# Patient Record
Sex: FEMALE | Race: WHITE | Employment: UNEMPLOYED | URBAN - NONMETROPOLITAN AREA
[De-identification: names, ages, dates, MRNs, and addresses within clinical notes are randomized per-mention and may not be internally consistent; named-entity substitution may affect disease eponyms.]

---

## 2017-03-02 ENCOUNTER — TELEPHONE (OUTPATIENT)
Dept: PULMONOLOGY | Age: 52
End: 2017-03-02

## 2017-05-05 DIAGNOSIS — J44.9 COPD, SEVERE (HCC): ICD-10-CM

## 2017-05-05 RX ORDER — ALBUTEROL SULFATE 2.5 MG/3ML
SOLUTION RESPIRATORY (INHALATION)
Qty: 120 VIAL | Refills: 7 | Status: SHIPPED | OUTPATIENT
Start: 2017-05-05 | End: 2018-11-08 | Stop reason: SDUPTHER

## 2017-05-08 ENCOUNTER — OFFICE VISIT (OUTPATIENT)
Dept: PULMONOLOGY | Age: 52
End: 2017-05-08

## 2017-05-08 VITALS
HEIGHT: 62 IN | DIASTOLIC BLOOD PRESSURE: 58 MMHG | BODY MASS INDEX: 34.96 KG/M2 | OXYGEN SATURATION: 93 % | SYSTOLIC BLOOD PRESSURE: 102 MMHG | TEMPERATURE: 97.6 F | WEIGHT: 190 LBS | HEART RATE: 100 BPM

## 2017-05-08 DIAGNOSIS — J44.9 COPD, SEVERE (HCC): Primary | ICD-10-CM

## 2017-05-08 PROCEDURE — G8428 CUR MEDS NOT DOCUMENT: HCPCS | Performed by: INTERNAL MEDICINE

## 2017-05-08 PROCEDURE — 99214 OFFICE O/P EST MOD 30 MIN: CPT | Performed by: INTERNAL MEDICINE

## 2017-05-08 PROCEDURE — 4004F PT TOBACCO SCREEN RCVD TLK: CPT | Performed by: INTERNAL MEDICINE

## 2017-05-08 PROCEDURE — G8417 CALC BMI ABV UP PARAM F/U: HCPCS | Performed by: INTERNAL MEDICINE

## 2017-05-08 PROCEDURE — 3014F SCREEN MAMMO DOC REV: CPT | Performed by: INTERNAL MEDICINE

## 2017-05-08 PROCEDURE — 3023F SPIROM DOC REV: CPT | Performed by: INTERNAL MEDICINE

## 2017-05-08 PROCEDURE — G8926 SPIRO NO PERF OR DOC: HCPCS | Performed by: INTERNAL MEDICINE

## 2017-05-08 RX ORDER — POTASSIUM CHLORIDE 1.5 G/1.77G
20 POWDER, FOR SOLUTION ORAL 2 TIMES DAILY
COMMUNITY
End: 2017-11-09

## 2017-05-08 RX ORDER — FLUTICASONE FUROATE AND VILANTEROL 200; 25 UG/1; UG/1
POWDER RESPIRATORY (INHALATION)
COMMUNITY
End: 2019-06-13 | Stop reason: ALTCHOICE

## 2017-05-08 RX ORDER — CLONAZEPAM 0.5 MG/1
0.5 TABLET ORAL 2 TIMES DAILY PRN
COMMUNITY

## 2017-05-08 RX ORDER — MESALAMINE 1.2 G/1
1200 TABLET, DELAYED RELEASE ORAL PRN
COMMUNITY
End: 2018-02-06

## 2017-05-08 RX ORDER — GABAPENTIN 100 MG/1
300 CAPSULE ORAL 3 TIMES DAILY
COMMUNITY
End: 2018-06-29

## 2017-06-28 ENCOUNTER — TELEPHONE (OUTPATIENT)
Dept: PULMONOLOGY | Age: 52
End: 2017-06-28

## 2017-08-08 DIAGNOSIS — J44.9 STAGE 3 SEVERE COPD BY GOLD CLASSIFICATION (HCC): ICD-10-CM

## 2017-08-22 ENCOUNTER — TELEPHONE (OUTPATIENT)
Dept: PULMONOLOGY | Age: 52
End: 2017-08-22

## 2017-08-22 DIAGNOSIS — J44.9 STAGE 4 VERY SEVERE COPD BY GOLD CLASSIFICATION (HCC): Primary | ICD-10-CM

## 2017-09-06 ENCOUNTER — TELEPHONE (OUTPATIENT)
Dept: PULMONOLOGY | Age: 52
End: 2017-09-06

## 2017-11-09 ENCOUNTER — OFFICE VISIT (OUTPATIENT)
Dept: PULMONOLOGY | Age: 52
End: 2017-11-09
Payer: MEDICARE

## 2017-11-09 VITALS
DIASTOLIC BLOOD PRESSURE: 62 MMHG | TEMPERATURE: 97.9 F | BODY MASS INDEX: 38.83 KG/M2 | HEART RATE: 119 BPM | SYSTOLIC BLOOD PRESSURE: 110 MMHG | WEIGHT: 211 LBS | OXYGEN SATURATION: 96 % | HEIGHT: 62 IN

## 2017-11-09 DIAGNOSIS — J45.50 SEVERE PERSISTENT ASTHMA WITHOUT COMPLICATION: ICD-10-CM

## 2017-11-09 DIAGNOSIS — J44.9 STAGE 4 VERY SEVERE COPD BY GOLD CLASSIFICATION (HCC): Primary | ICD-10-CM

## 2017-11-09 DIAGNOSIS — Z01.818 PRE-OP EVALUATION: ICD-10-CM

## 2017-11-09 PROCEDURE — 3017F COLORECTAL CA SCREEN DOC REV: CPT | Performed by: INTERNAL MEDICINE

## 2017-11-09 PROCEDURE — 3023F SPIROM DOC REV: CPT | Performed by: INTERNAL MEDICINE

## 2017-11-09 PROCEDURE — 99215 OFFICE O/P EST HI 40 MIN: CPT | Performed by: INTERNAL MEDICINE

## 2017-11-09 PROCEDURE — 1036F TOBACCO NON-USER: CPT | Performed by: INTERNAL MEDICINE

## 2017-11-09 PROCEDURE — G8427 DOCREV CUR MEDS BY ELIG CLIN: HCPCS | Performed by: INTERNAL MEDICINE

## 2017-11-09 PROCEDURE — G8484 FLU IMMUNIZE NO ADMIN: HCPCS | Performed by: INTERNAL MEDICINE

## 2017-11-09 PROCEDURE — G8926 SPIRO NO PERF OR DOC: HCPCS | Performed by: INTERNAL MEDICINE

## 2017-11-09 PROCEDURE — G8417 CALC BMI ABV UP PARAM F/U: HCPCS | Performed by: INTERNAL MEDICINE

## 2017-11-09 RX ORDER — RANOLAZINE 500 MG/1
500 TABLET, EXTENDED RELEASE ORAL 2 TIMES DAILY
COMMUNITY
End: 2019-02-20

## 2017-11-09 RX ORDER — EPINEPHRINE 0.3 MG/.3ML
0.3 INJECTION SUBCUTANEOUS ONCE
Qty: 1 EACH | Refills: 0 | Status: SHIPPED | OUTPATIENT
Start: 2017-11-09 | End: 2018-02-06

## 2017-11-09 RX ORDER — AZELASTINE HYDROCHLORIDE, FLUTICASONE PROPIONATE 137; 50 UG/1; UG/1
SPRAY, METERED NASAL
COMMUNITY
End: 2018-02-06

## 2017-11-09 NOTE — PATIENT INSTRUCTIONS
Health Maintenance Due   Topic Date Due    HIV screen  11/11/1981    DTaP/Tdap/Td vaccine (1 - Tdap) 11/11/1985    Cervical cancer screen  11/11/1987    Lipid screen  11/11/2006    Diabetes screen  11/11/2006    Breast cancer screen  11/11/2016    Colon cancer screen colonoscopy  11/11/2016    Flu vaccine (1) 09/01/2017     Recommendations/Plan:  - Will resubmit application for Xolair 532IT dose every 2 weeks. ( Max 150mg /injection site) with financial assistance for co- pay. She agreed to go for Xolair therapy. She verbalizes understanding.  -She refused to go for home BiPAP/Triology eval for her severe COPD. She told me that she is severely claustrophobic and she can not keep anything on her face.  -Continue Daliresp 500mcg 1 tab po daily.   -Continue Breo Ellipta 200/25 mcg/blister DPI 1puff daily in am.  -Continue Spiriva Respimat 2 INH once daily in am.  -Continue Albuterol HFA 2 puffs Q6h and Albuterol nebs 2.5mg via nebs Q6h prn.  -She instructed to use Albuterol HFA  inhaler 2 puffs Q6h prn or Albuterol 2.5mg nebs Q6h prn (the nebulizer) at one time as rescue medication not both at the same time. She verbalizes understanding.   - Patient educated to quit smoking as soon as possible. Patient verbalizes understanding of adverse consequences of tobacco smoking (4minutes). -Patient advised to continue prescribed inhalers and keep good compliance. Patient verbalizes understanding.   - Patient educated to update his pneumococcal vaccine with family physician and take influenza vaccine in coming season with out fail. Patient verbalizes understanding.   - Patient educated about my impression and plan. Patient verbalizes understanding.  - She was advised to use 4LPM of home O2 with exercise, 2LPM with sleep and at rest.  - Schedule patient for follow up with my clinic in 4 months.  Patient advised to make early appointment if needed.   -Patient can go for planned surgery from pulmonary point of view with the following risk I.e Colonoscopy by MD Hadley  -Patient is at high risk for carl and post operative pulmonary complications, if general anesthesia is used as anesthesia of choice for planned procedure. The risk also includes prolonged mechanical ventilation with requirement of tracheostomy for weaning from vent.  -Patient needs vigorous bronchodilator therapy with albuterol 2.5mg via nebulizer Q6h and Q2h prn before, during and after procedure.  Patient need to continue rest of Her inhalers as prescribed.  -Patient about increased risk and she is aware of the risk

## 2017-11-09 NOTE — PROGRESS NOTES
[facility]                                                 Pulmonary medicine clinic follow up note/Pre op for colonoscopy by MD Hadley     Lung Nodule Screening     [] Qualifies    [x] Does not qualify   [] Declined    [] Completed    Patient: Carol Glaser  : 1965      Carol Glaser is a 49 y. o.oldfemale came for follow up regarding her verey severe COPD and bronchial asthma. She is currently using Daliresp 500mcg 1 tab po daily, Breo Ellipta 200/25 mcg/blister DPI 1puff daily in am, Spiriva Respimat 2 INH once daily in am and Albuterol HFA 2 puffs Q6h or Albuterol nebs 2.5mg via nebs Q6h prn. She quit taking Xolair. She is currently using  4LPM of home O2 with exercise, 2LPM with sleep and at rest. She had multiple visits to ER at [facility] and she was hospitalized for few days in , July and August. She was not intubated. She quit taking Xolair after taking 4shots due to her co pay and did not want to go back on it. Patient did not want to go for Pulmonary rehab consult at this time and she verbalizes understanding of consequences of her decision. Her Advair was discontinued due to formulary change from her insurance to Symbicort. She was put back on after her recent hospitalization. She was initially referred from Dr. Ranulfo Rm. She was diagnosed with COPD and bronchial asthma in the past and used to follow with Dr. Anselmo Hernandez at [facility]. Social History: Occupation: She is currently not working. She used to work in [employer] in the past for 10years. She used to deal with lot of wool. Patient admits to history of tobacco smoking. She used to smoke 1.5  PPD for 30 Years. She quit smoking 0.5PPD. She denies history of exposure to coal, foundry, [place], asbestos or significant farm dust exposure. She exposed to lot of wool. Patient denies any recent travel.      Patient denies history of exposure to birds, pigeons, or chickens in the past. The patient admits toto pet animals at home. Shecurrently had 2dogs and 1cat at home. She denies to history of recreational or IV drug use. Shedenies history of pulmonary embolism or DVT in the past.      Review of Systems:   General/Constitutional: She gained 21lbs of weight from last visit with normal appetite changes. No fever or chills. HENT: Negative. Eyes: Negative. Upper respiratory tract: No nasal stuffiness with no post nasal drip. She is currently not using any intranasal sprays. Lower respiratory tract/ lungs:  Occasional cough with no sputum production. She is still having exertional shortness of breath. No hemoptysis recently. Cardiovascular: No palpitations or chest pain. Gastrointestinal: No nausea or vomiting. Neurological: No focal neurologiacal weakness. Extremities: No edema. Musculoskeletal: No complaints. Genitourinary: No complaints. Hematological: Negative. Psychiatric/Behavioral: Negative. Skin: No itching.       Current Medications:      Past Medical History:   Diagnosis Date    Abdominal pain, unspecified site     Anxiety     Asthma     Chest pain, unspecified     Chronic airway obstruction, not elsewhere classified     Chronic kidney disease     Depression     Diarrhea     Essential hypertension, benign     GERD (gastroesophageal reflux disease)     Headache(784.0)     Impaired fasting glucose     Leukocytosis, unspecified     Lump or mass in breast     Mixed hyperlipidemia     Obstructive chronic bronchitis with exacerbation (HCC)     Other and unspecified angina pectoris     Sinus infection     Type II or unspecified type diabetes mellitus without mention of complication, not stated as uncontrolled     Quiles-Parkinson-White syndrome 4/22/15       Past Surgical History:   Procedure Laterality Date    APPENDECTOMY      BLADDER SUSPENSION      CARDIAC CATHETERIZATION      CHOLECYSTECTOMY      COLONOSCOPY 3213,0744    DILATION AND CURETTAGE OF UTERUS      x3    ENDOMETRIAL ABLATION  2010    HEMORRHOID SURGERY      TUBAL LIGATION      URETHRA SURGERY      sling       Allergies   Allergen Reactions    Pcn [Penicillins]     Sulfa Antibiotics        Current Outpatient Prescriptions   Medication Sig Dispense Refill    ranolazine (RANEXA) 500 MG extended release tablet Take 500 mg by mouth 2 times daily      Azelastine-Fluticasone 137-50 MCG/ACT SUSP by Nasal route      DULoxetine (CYMBALTA) 30 MG extended release capsule Take 30 mg by mouth daily      furosemide (LASIX) 40 MG tablet Take 40 mg by mouth 2 times daily      spironolactone (ALDACTONE) 25 MG tablet Take 25 mg by mouth daily      traMADol (ULTRAM) 50 MG tablet Take 50 mg by mouth as needed for Pain      Cholecalciferol (VITAMIN D3) 22655 units CAPS Take 50,000 Units by mouth once a week      magnesium oxide (MAG-OX) 400 MG tablet Take 400 mg by mouth daily      Thiamine Mononitrate (VITAMIN B1 PO) Take 250 mg by mouth daily      Probiotic Product (BACID PO) Take 1 tablet by mouth 3 times daily      metFORMIN (GLUCOPHAGE) 1000 MG tablet Take 1,000 mg by mouth 2 times daily (with meals)      tiotropium (SPIRIVA RESPIMAT) 2.5 MCG/ACT AERS inhaler Inhale 2 puffs into the lungs daily      Fluticasone Furoate-Vilanterol (BREO ELLIPTA) 200-25 MCG/INH AEPB Inhale into the lungs      mesalamine (LIALDA) 1.2 G EC tablet Take 1,200 mg by mouth as needed       gabapentin (NEURONTIN) 100 MG capsule Take 300 mg by mouth 3 times daily       clonazePAM (KLONOPIN) 0.5 MG tablet Take 0.5 mg by mouth 2 times daily as needed      albuterol (PROVENTIL) (2.5 MG/3ML) 0.083% nebulizer solution INHALE contents OF 1 vial via NEBULIZER EVERY 6 HOURS AS NEEDED FOR WHEEZING 120 vial 7    VENTOLIN  (90 BASE) MCG/ACT inhaler INHALE TWO PUFFS BY MOUTH FOUR TIMES DAILY 1 Inhaler 6    DALIRESP 500 MCG tablet TAKE ONE TABLET BY MOUTH DAILY 30 tablet 11    esomeprazole (NEXIUM) 40 MG capsule Take 40 mg by mouth every morning (before breakfast)      traZODone (DESYREL) 50 MG tablet Take 50 mg by mouth nightly      metoprolol (LOPRESSOR) 25 MG tablet Take 25 mg by mouth 2 times daily       OXYGEN Inhale  into the lungs nightly.  isosorbide mononitrate (IMDUR) 30 MG CR tablet Take 15 mg by mouth daily       busPIRone (BUSPAR) 10 MG tablet Take 10 mg by mouth 2 times daily       atorvastatin (LIPITOR) 40 MG tablet Take 40 mg by mouth daily. No current facility-administered medications for this visit. Family History   Problem Relation Age of Onset    Diabetes Mother     High Blood Pressure Mother     High Blood Pressure Father     Heart Disease Father          Physical Exam     VITALS:    /62   Pulse 119   Temp 97.9 °F (36.6 °C) (Tympanic)   Ht 5' 2\" (1.575 m)   Wt 211 lb (95.7 kg)   SpO2 96% Comment: 5L conservative O2  BMI 38.59 kg/m²     Nursing note and vitals reviewed. Constitutional: Patient appears well built and well nourished. No distress. Patient is oriented to person, place, and time. HENT:   Head: Normocephalic and atraumatic. Right Ear: External ear normal.   Left Ear: External ear normal.   Mouth/Throat: Oropharynx is clear and moist.  No oral thrush. Eyes: Conjunctivae are normal. Pupils are equal, round, and reactive to light. No scleral icterus. Neck: Neck supple. No JVD present. No tracheal deviation present. Cardiovascular: Normal rate, regular rhythm, normal heart sounds. No murmur heard. Pulmonary/Chest: Effort normal and breath sounds normal. No stridor. No respiratory distress. Bilateral expiratory wheezes. No rales. Patient exhibits no tenderness. Abdominal: Soft. Patient exhibits no distension. No tenderness. Musculoskeletal: Normal range of motion. Extremities: Patient exhibits no edema and no tenderness. Lymphadenopathy:  No cervical adenopathy.    Neurological: Patient is alert and Signed By: Shivam Bobby (Physician) 03/24/2014 1912. Serum total IgE:      Mini RAST:      Echocardiogram:                Sleep study:4/23/13        She lost 5lbs of weight from her last sleep study done in 2013. Xolair eval:  IgE level on 1/16/15: 518. Weight 5/7/15 : 90.266kg. Six minute walk test:    -Six minute walk test done on 05/07/2015 at 9:59 AM in my office by medical assistant Cira Zenaida:  The patient oxygen saturation at rest on room air was 93 %. The patient oxygen saturation dropped to 85% on room air with exertion after 46 seconds. -Six minute walk test was repeated on 05/07/2015 at 10:00 AM with oxygen supplementation. Oxygen supplimentation was started with 1LPM via nasal cannula and titrated to 2 LPM via nasal cannula. At the end of the test patient oxygen saturation remained at 91% on 2  LPM with exertion . Plan:  - She was advised to use 2LPM of home O2 with exercise, sleep and no home O2 at rest.  DME Medical Necessity Documentation    Patient was seen in the hospital on 05/07/2015  for the diagnosis COPD. I am prescribing oxygen because the diagnosis and testing requires the patient to have oxygen in the home. Condition will improve or be benefited by oxygen use. The patient is  able to perform good mobility in a home setting and therefore does require the use of a portable oxygen system. Spirometry 2016:                Chest Xray on 6/26/16:  Central bronchial wall thickening may be associated with acute or chronic inflammatory changes. Mild cardiomegaly. Six Minute Walk Test done on 5/8/17 at 2:23 PM    Herb Crest 1965    Six minute walk test done in my office today by my medical assistant Miss. Lozoya: Екатерина's oxygen saturation at rest on room air was 86 %. She was started on 2LPM of O2 supplementation. With 2LPM of O2 her oxygen saturation was 92%    The six minute walk test was repeated with oxygen supplementation.  Oxygen supplimentation was started with 2LPM via nasal cannula and titrated to 4 LPM via nasal cannula. At the end of the test Nayana Atwood's oxygen saturation remained at 92% on 4 LPM with exertion. She is mobile in the home and requires oxygen as outlined above. Michael Burleson needs 2LPM home O2 at rest and during sleep. She needs 4LPM of home O2 with exercise. DME Medical Necessity Documentation    Patient was seen in my clinic on 5/8/17 for the diagnosis COPD. I am prescribing oxygen because the diagnosis and testing requires the patient to have oxygen in the home. Condition will improve or be benefited by oxygen use. The patient is  able to perform good mobility in a home setting and therefore does require the use of a portable oxygen system. Sleep test:11/29/2016                            Spirometry 2017:              Her FEV1 decreased from previous spirometry done in the past.        Assessment:  -Severe COPD-  under moderate control with worsening of FEV1. She is having ongoing exacerbation.  -Severe persistent bronchial asthma- Not under good control. She did not want to use Xolair at the last visit. She quit taking Xolair after taking 4shots due to her co pay. -Chronic hx of tobacco abuse. She is still smoking 0. 5PPweek. -Hypertension-under good control. She took her anti HTN meds so far  -Depression on meds. -WPW syndrome. She is currently waiting for ablation in [facility].      Recommendations/Plan:  - Will resubmit application for Xolair 406PU dose every 2 weeks. ( Max 150mg /injection site) with financial assistance for co- pay. She agreed to go for Xolair therapy. She verbalizes understanding.  -She refused to go for home BiPAP/Triology eval for her severe COPD.  She told me that she is severely claustrophobic and she can not keep anything on her face.  -Continue Daliresp 500mcg 1 tab po daily.   -Continue Breo Ellipta 200/25 mcg/blister DPI 1puff daily in am.  -Continue Spiriva Respimat 2 INH once daily in am.  -Continue Albuterol HFA 2 puffs Q6h and Albuterol nebs 2.5mg via nebs Q6h prn.  -She instructed to use Albuterol HFA  inhaler 2 puffs Q6h prn or Albuterol 2.5mg nebs Q6h prn (the nebulizer) at one time as rescue medication not both at the same time. She verbalizes understanding.   - Patient educated to quit smoking as soon as possible. Patient verbalizes understanding of adverse consequences of tobacco smoking (4minutes). -Patient advised to continue prescribed inhalers and keep good compliance. Patient verbalizes understanding.   - Patient educated to update his pneumococcal vaccine with family physician and take influenza vaccine in coming season with out fail. Patient verbalizes understanding.   - Patient educated about my impression and plan. Patient verbalizes understanding.  - She was advised to use 4LPM of home O2 with exercise, 2LPM with sleep and at rest.  - Schedule patient for follow up with my clinic in 4 months. Patient advised to make early appointment if needed.   -Patient can go for planned surgery from pulmonary point of view with the following risk I.e Colonoscopy by MD Hadley  -Patient is at high risk for carl and post operative pulmonary complications, if general anesthesia is used as anesthesia of choice for planned procedure. The risk also includes prolonged mechanical ventilation with requirement of tracheostomy for weaning from vent.  -Patient needs vigorous bronchodilator therapy with albuterol 2.5mg via nebulizer Q6h and Q2h prn before, during and after procedure.  Patient need to continue rest of Her inhalers as prescribed.  -Patient about increased risk and she is aware of the risk      -She refused to go on maintenance steroids offered by her family physician with prednisone 5mg po daily.  -Will refer patient to [facility] Pulmonary department for further evaluation and management of worsening of COPD for lung transplant if she is off tobacco

## 2017-11-10 ENCOUNTER — TELEPHONE (OUTPATIENT)
Dept: PULMONOLOGY | Age: 52
End: 2017-11-10

## 2017-11-10 DIAGNOSIS — J45.50 SEVERE PERSISTENT ASTHMA WITHOUT COMPLICATION: Primary | ICD-10-CM

## 2018-02-06 ENCOUNTER — OFFICE VISIT (OUTPATIENT)
Dept: PULMONOLOGY | Age: 53
End: 2018-02-06
Payer: MEDICARE

## 2018-02-06 VITALS
SYSTOLIC BLOOD PRESSURE: 106 MMHG | HEIGHT: 62 IN | OXYGEN SATURATION: 92 % | BODY MASS INDEX: 37.54 KG/M2 | WEIGHT: 204 LBS | HEART RATE: 125 BPM | DIASTOLIC BLOOD PRESSURE: 62 MMHG | TEMPERATURE: 97.8 F

## 2018-02-06 DIAGNOSIS — Z72.0 TOBACCO ABUSE: ICD-10-CM

## 2018-02-06 DIAGNOSIS — J44.9 STAGE 4 VERY SEVERE COPD BY GOLD CLASSIFICATION (HCC): Primary | ICD-10-CM

## 2018-02-06 DIAGNOSIS — J45.50 SEVERE PERSISTENT ASTHMA WITHOUT COMPLICATION: ICD-10-CM

## 2018-02-06 PROCEDURE — 99406 BEHAV CHNG SMOKING 3-10 MIN: CPT | Performed by: NURSE PRACTITIONER

## 2018-02-06 PROCEDURE — 94618 PULMONARY STRESS TESTING: CPT | Performed by: NURSE PRACTITIONER

## 2018-02-06 PROCEDURE — 99214 OFFICE O/P EST MOD 30 MIN: CPT | Performed by: NURSE PRACTITIONER

## 2018-02-06 RX ORDER — PREDNISONE 1 MG/1
5 TABLET ORAL DAILY
Qty: 30 TABLET | Refills: 2 | Status: SHIPPED | OUTPATIENT
Start: 2018-02-06 | End: 2019-02-06

## 2018-02-06 ASSESSMENT — ENCOUNTER SYMPTOMS
DOUBLE VISION: 0
SPUTUM PRODUCTION: 0
DIARRHEA: 0
RHINORRHEA: 1
COUGH: 0
VOMITING: 0
CHEST TIGHTNESS: 1
SHORTNESS OF BREATH: 1
HEMOPTYSIS: 0
WHEEZING: 1
NAUSEA: 0
BLURRED VISION: 0

## 2018-02-06 ASSESSMENT — COPD QUESTIONNAIRES: COPD: 1

## 2018-02-06 NOTE — PROGRESS NOTES
[facility]    Patient: Kayla Gonzalez, 47 y.o.   : 1965    Pt of Dr. Luis Torres   Patient presents with    COPD     4 mo f/u no test, needs F2F documentation for O2 w/ 6 min walk        COPD   She complains of chest tightness, shortness of breath and wheezing. There is no cough, hemoptysis or sputum production. This is a chronic problem. The current episode started more than 1 year ago. The problem occurs constantly. The problem has been gradually worsening. Associated symptoms include dyspnea on exertion, nasal congestion and rhinorrhea. Pertinent negatives include no chest pain, fever or weight loss. Her symptoms are aggravated by any activity, climbing stairs and strenuous activity. Her symptoms are alleviated by rest and beta-agonist. She reports moderate improvement on treatment. Risk factors for lung disease include smoking/tobacco exposure. Her past medical history is significant for asthma and COPD. Surendra Gonzalez is here for follow up for COPD. Needs face to face for portable concentrator. Recently admitted to [facility] for COPD exac. Was prescribed a BiPAP machine after discharge reports being able to tolerate 6 out of 7 nights of the week still feels very claustrophobic. Still smoking has continued to wean down reports maybe 5 cigarettes per month. Is around a lot of second hand smoke. Pulmonary rehab ordered but has not started yet wants to wait till after vacation. Did not start on xolair. Reports no side effect from daliresp. Progress History:   Since last visit any new medical issues? No  New ER or hospital visits? Yes as noted above   Any new or changes in medicines? No  Using inhalers? Yes spiriva, breo and albuterol MDI/nebulizer  Are they helpful? Yes, reports good compliance with medications  Flu vaccine? Yes  Pneumonia vaccine?  Yes within the last 5 years  Past Medical hx

## 2018-03-21 ENCOUNTER — TELEPHONE (OUTPATIENT)
Dept: PULMONOLOGY | Age: 53
End: 2018-03-21

## 2018-06-29 ENCOUNTER — OFFICE VISIT (OUTPATIENT)
Dept: PULMONOLOGY | Age: 53
End: 2018-06-29
Payer: COMMERCIAL

## 2018-06-29 VITALS
TEMPERATURE: 98.2 F | WEIGHT: 210 LBS | OXYGEN SATURATION: 92 % | BODY MASS INDEX: 38.64 KG/M2 | HEART RATE: 103 BPM | RESPIRATION RATE: 16 BRPM | HEIGHT: 62 IN | SYSTOLIC BLOOD PRESSURE: 128 MMHG | DIASTOLIC BLOOD PRESSURE: 84 MMHG

## 2018-06-29 DIAGNOSIS — J44.9 STAGE 4 VERY SEVERE COPD BY GOLD CLASSIFICATION (HCC): Primary | ICD-10-CM

## 2018-06-29 DIAGNOSIS — Z99.81 CHRONIC RESPIRATORY FAILURE WITH HYPOXIA, ON HOME OXYGEN THERAPY (HCC): ICD-10-CM

## 2018-06-29 DIAGNOSIS — Z72.0 TOBACCO ABUSE: ICD-10-CM

## 2018-06-29 DIAGNOSIS — J96.11 CHRONIC RESPIRATORY FAILURE WITH HYPOXIA, ON HOME OXYGEN THERAPY (HCC): ICD-10-CM

## 2018-06-29 PROCEDURE — 99214 OFFICE O/P EST MOD 30 MIN: CPT | Performed by: NURSE PRACTITIONER

## 2018-06-29 ASSESSMENT — ENCOUNTER SYMPTOMS
BLURRED VISION: 0
SHORTNESS OF BREATH: 1
HEMOPTYSIS: 0
WHEEZING: 0
DOUBLE VISION: 0
DIARRHEA: 0
VOMITING: 0
NAUSEA: 0

## 2018-06-29 ASSESSMENT — COPD QUESTIONNAIRES: COPD: 1

## 2018-07-25 ENCOUNTER — TELEPHONE (OUTPATIENT)
Dept: PULMONOLOGY | Age: 53
End: 2018-07-25

## 2018-07-25 DIAGNOSIS — J44.9 STAGE 4 VERY SEVERE COPD BY GOLD CLASSIFICATION (HCC): Primary | ICD-10-CM

## 2018-07-25 NOTE — TELEPHONE ENCOUNTER
Called and advised patient. She states she already is on 4 lpm.  She states she did the test on 4 lpm.  She wants to make sure that you want her on 4 lpm.  Please advise.

## 2018-07-25 NOTE — TELEPHONE ENCOUNTER
On test paperwork it reports patient was tested on 3 LPM. Have her use 4 LPM bleed in to her PAP machine.

## 2018-07-25 NOTE — TELEPHONE ENCOUNTER
Patient had recent overnight pulse oxymetry ordered by PCP completed and showed that patient requires 4 LPM bleed in through PAP. Please and inform patient order will be sent to patient medical supply company to reflect this change.

## 2018-07-26 ENCOUNTER — TELEPHONE (OUTPATIENT)
Dept: PULMONOLOGY | Age: 53
End: 2018-07-26

## 2018-07-26 NOTE — TELEPHONE ENCOUNTER
Pt called and since you originally wanted her to bump up a Liter to 4 and this is what she is already on should she go up or just stay on 4L

## 2018-07-27 NOTE — TELEPHONE ENCOUNTER
I previously interpreted her test to answer her question but her non-invasive ventilator was given to her by her PCP this test was also ordered by her PCP. Please refer questions to her PCP provider regarding management of this therapy.

## 2018-11-08 DIAGNOSIS — J44.9 COPD, SEVERE (HCC): ICD-10-CM

## 2018-11-10 RX ORDER — ALBUTEROL SULFATE 2.5 MG/3ML
SOLUTION RESPIRATORY (INHALATION)
Qty: 360 ML | Refills: 3 | Status: SHIPPED | OUTPATIENT
Start: 2018-11-10

## 2019-01-02 RX ORDER — ROFLUMILAST 500 UG/1
TABLET ORAL
Qty: 90 TABLET | Refills: 3 | Status: SHIPPED | OUTPATIENT
Start: 2019-01-02 | End: 2019-06-24 | Stop reason: SDUPTHER

## 2019-01-02 RX ORDER — TIOTROPIUM BROMIDE INHALATION SPRAY 3.12 UG/1
SPRAY, METERED RESPIRATORY (INHALATION)
Qty: 12 G | Refills: 3 | Status: SHIPPED | OUTPATIENT
Start: 2019-01-02 | End: 2019-06-13 | Stop reason: ALTCHOICE

## 2019-04-08 DIAGNOSIS — J44.9 STAGE 4 VERY SEVERE COPD BY GOLD CLASSIFICATION (HCC): ICD-10-CM

## 2019-06-12 ENCOUNTER — OFFICE VISIT (OUTPATIENT)
Dept: PULMONOLOGY | Age: 54
End: 2019-06-12
Payer: MEDICARE

## 2019-06-12 VITALS
DIASTOLIC BLOOD PRESSURE: 68 MMHG | HEIGHT: 62 IN | HEART RATE: 96 BPM | SYSTOLIC BLOOD PRESSURE: 116 MMHG | TEMPERATURE: 99.7 F | OXYGEN SATURATION: 94 % | BODY MASS INDEX: 37.91 KG/M2 | WEIGHT: 206 LBS

## 2019-06-12 DIAGNOSIS — I48.0 PAROXYSMAL ATRIAL FIBRILLATION (HCC): ICD-10-CM

## 2019-06-12 DIAGNOSIS — J96.12 CHRONIC RESPIRATORY FAILURE WITH HYPOXIA AND HYPERCAPNIA (HCC): ICD-10-CM

## 2019-06-12 DIAGNOSIS — J96.11 CHRONIC RESPIRATORY FAILURE WITH HYPOXIA AND HYPERCAPNIA (HCC): ICD-10-CM

## 2019-06-12 DIAGNOSIS — J18.9 PNEUMONIA OF LEFT LOWER LOBE DUE TO INFECTIOUS ORGANISM: ICD-10-CM

## 2019-06-12 DIAGNOSIS — J44.9 STAGE 4 VERY SEVERE COPD BY GOLD CLASSIFICATION (HCC): ICD-10-CM

## 2019-06-12 DIAGNOSIS — J44.9 STAGE 4 VERY SEVERE COPD BY GOLD CLASSIFICATION (HCC): Primary | ICD-10-CM

## 2019-06-12 PROCEDURE — 1036F TOBACCO NON-USER: CPT | Performed by: NURSE PRACTITIONER

## 2019-06-12 PROCEDURE — 3017F COLORECTAL CA SCREEN DOC REV: CPT | Performed by: NURSE PRACTITIONER

## 2019-06-12 PROCEDURE — 99215 OFFICE O/P EST HI 40 MIN: CPT | Performed by: NURSE PRACTITIONER

## 2019-06-12 PROCEDURE — 3023F SPIROM DOC REV: CPT | Performed by: NURSE PRACTITIONER

## 2019-06-12 PROCEDURE — G8427 DOCREV CUR MEDS BY ELIG CLIN: HCPCS | Performed by: NURSE PRACTITIONER

## 2019-06-12 PROCEDURE — G8417 CALC BMI ABV UP PARAM F/U: HCPCS | Performed by: NURSE PRACTITIONER

## 2019-06-12 PROCEDURE — G8926 SPIRO NO PERF OR DOC: HCPCS | Performed by: NURSE PRACTITIONER

## 2019-06-12 RX ORDER — ALBUTEROL SULFATE 90 UG/1
2 AEROSOL, METERED RESPIRATORY (INHALATION) EVERY 6 HOURS PRN
COMMUNITY
End: 2019-06-24 | Stop reason: SDUPTHER

## 2019-06-12 RX ORDER — AZITHROMYCIN 250 MG/1
250 TABLET, FILM COATED ORAL
Qty: 12 TABLET | Refills: 0 | Status: SHIPPED | OUTPATIENT
Start: 2019-06-12 | End: 2019-06-24 | Stop reason: SDUPTHER

## 2019-06-12 RX ORDER — PREDNISONE 1 MG/1
10 TABLET ORAL DAILY
COMMUNITY

## 2019-06-13 NOTE — PROGRESS NOTES
[facility]    Patient: Shabana Kirkland, 47 y.o.   : 1965    Pt of Dr. Marvel Elkins   Patient presents with    COPD     goldy 19    Other     neck   19.75     mp 3        HPI  Lucio Fermin is here for 6 month follow up for very severe COPD with FEV1 16%. She has a 34 pack year smoking history quitting 19 when hospitalized at [facility] for respiratory failure and LLL PNA. She was intubated. Presents today continuing to feel SOB and fatigued. She is on home 02 at 3 Liters rest and activity and 4 Liters with Trilogy. It sounds as if her Trilogy was set up during a hospitalization and is unclear who is following it. She obtained through [employer] [place] [employer] [place]. She has had a normal sleep study in 2016. She previously followed with Dr. Alize Jaimes and Nayan Martin, CNP. She had an overnight pulsox done which showed desaturations with the device on 3 Liters (she states was done on 4 Liters). She continues on Spiriva and Breo, using ANGEL 4-6 times per day. She is on Daliresp. Noted to be following Dr. Lauren Montanez for diarrhea and recent colonoscopy. She states she has IBS and the diarrhea was before Daliresp and no worse. Weight and appetite have been stable. She is on chronic Prednisone 5 mg daily and followed with Endocrinology. 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 She has not followed up with a CXR. I reviewed records from [facility] in [facility]. CT chest 19: Complete opacification of LLL with loculated pleural effusion, occlusion of LLL bronchi and centrilobular emphysema. Echo 19: EF 60% with moderate RA and RV enlargement. ECG 19: QTc 428 ms. She presents today with recent PFT showing improved FEV1 to 25% with FEV/FVC PD 59%. I also obtained most recent download of Trilogy from [employer] [place] [employer] [place] with 74% compliance.   AVAPS-AE Mode    Breaths: Auto  Maximum Pressure 30  PS 5-15  EPAP 5-15      Progress History: Since last visit any new medical issues? No  New ER or hospitlal visits? Yes As above  Any new or changes in medicines? No  Using inhalers? Yes Spiriva and Breo  Are they helpful?  Somewhat  Past Medical hx   PMH:  Past Medical History:   Diagnosis Date    Abdominal pain, unspecified site     Anxiety     Asthma     Chest pain, unspecified     Chronic kidney disease     Depression     Diarrhea     Essential hypertension, benign     GERD (gastroesophageal reflux disease)     Headache(784.0)     Impaired fasting glucose     Leukocytosis, unspecified     Lump or mass in breast     Mixed hyperlipidemia     Obstructive chronic bronchitis with exacerbation (HCC)     Other and unspecified angina pectoris     Sinus infection     Stage 4 very severe COPD by GOLD classification ([facility] [address]     Type II or unspecified type diabetes mellitus without mention of complication, not stated as uncontrolled     Quiles-Parkinson-White syndrome 4/22/15     SURGICAL HISTORY:  Past Surgical History:   Procedure Laterality Date    APPENDECTOMY      BLADDER SUSPENSION      CARDIAC CATHETERIZATION      CHOLECYSTECTOMY      [facility]  [id number]    [facility]    DILATION AND CURETTAGE OF UTERUS      x3    EGD  2017    [facility]    ENDOMETRIAL ABLATION  2010    HEMORRHOID SURGERY      TUBAL LIGATION      URETHRA SURGERY      sling     SOCIAL HISTORY:  Social History     Tobacco Use    Smoking status: Former Smoker     Packs/day: 1.00     Years: 34.00     Pack years: 34.00     Types: Cigarettes     Start date: 1981     Last attempt to quit: 2019     Years since quittin.1    Smokeless tobacco: Never Used   Substance Use Topics    Alcohol use: No     Alcohol/week: 0.0 oz    Drug use: No     ALLERGIES:  Allergies   Allergen Reactions    Pcn [Penicillins]     Sulfa Antibiotics      FAMILY HISTORY:  Family History   Problem Relation Age of Onset    Diabetes Mother     High Blood Pressure Mother     High Blood Pressure Father     Heart Disease Father      CURRENT MEDICATIONS:  Current Outpatient Medications   Medication Sig Dispense Refill    albuterol sulfate HFA (VENTOLIN HFA) 108 (90 Base) MCG/ACT inhaler Inhale 2 puffs into the lungs every 6 hours as needed for Wheezing      predniSONE (DELTASONE) 5 MG tablet Take 5 mg by mouth daily      apixaban (ELIQUIS) 5 MG TABS tablet Take by mouth 2 times daily      azithromycin (ZITHROMAX) 250 MG tablet Take 1 tablet by mouth three times a week Monday - Wednesday-Friday 12 tablet 0    Fluticasone-Umeclidin-Vilant (TRELEGY ELLIPTA) 100-62.5-25 MCG/INH AEPB Inhale 1 puff into the lungs daily 30 each 11    pantoprazole (PROTONIX) 40 MG tablet Take 40 mg by mouth daily      ALPRAZolam (XANAX) 0.25 MG tablet Take 0.25 mg by mouth nightly as needed for Sleep. Rachelgeraldo Reyes potassium chloride (KLOR-CON) 20 MEQ packet Take 20 mEq by mouth daily Two tablets once a day      prednisoLONE 5 MG TABS Take 5 mg by mouth daily      polyethylene glycol (GLYCOLAX) powder Colonoscopy Prep Dispense 255 Gram Bottle. Use as Directed 255 g 0    DALIRESP 500 MCG tablet TAKE 1 TABLET EVERY DAY 90 tablet 3    albuterol (PROVENTIL) (2.5 MG/3ML) 0.083% nebulizer solution INHALE CONTENTS OF 1 VIAL VIA NEBULIZER EVERY 6 HOURS AS NEEDED FOR WHEEZING 360 mL 3    DULoxetine (CYMBALTA) 30 MG extended release capsule Take 60 mg by mouth daily       furosemide (LASIX) 40 MG tablet Take 20 mg by mouth daily       spironolactone (ALDACTONE) 25 MG tablet Take 25 mg by mouth daily      traMADol (ULTRAM) 50 MG tablet Take 50 mg by mouth 2 times daily as needed for Pain.  Rachelgeraldo Reyes metFORMIN (GLUCOPHAGE) 1000 MG tablet Take 500 mg by mouth daily (with breakfast) Indications: only when on steroids       clonazePAM (KLONOPIN) 0.5 MG tablet Take 0.5 mg by mouth 2 times daily as needed      traZODone (DESYREL) 50 MG tablet Take 50 mg by mouth nightly      metoprolol (LOPRESSOR) 25 MG tablet Take 50 mg by mouth 2 times daily       isosorbide mononitrate (IMDUR) 30 MG CR tablet Take 30 mg by mouth daily       busPIRone (BUSPAR) 10 MG tablet Take 15 mg by mouth 3 times daily Indications: Pt. takes 20mg at night, 10 mg in morning       atorvastatin (LIPITOR) 40 MG tablet Take 40 mg by mouth daily. No current facility-administered medications for this visit. Kasi HART   General/Constitutional: No recent loss of weight or appetite changes. No fever or chills. HENT: Negative. Eyes: Negative. Upper respiratory tract: No nasal stuffiness or post nasal drip. Lower respiratory tract/ lungs: Occasional cough with white sputum production. No hemoptysis. Cardiovascular: No palpitations or chest pain. Gastrointestinal: No nausea or vomiting. Neurological: No focal neurologiacal weakness. Extremities: No increased edema. Musculoskeletal: No complaints. Genitourinary: No complaints. Hematological: Negative. Psychiatric/Behavioral: Negative. Skin: No itching. Physical exam   /68 (Site: Left Upper Arm, Position: Sitting, Cuff Size: Small Adult)   Pulse 96   Temp 99.7 °F (37.6 °C)   Ht 5' 2\" (1.575 m)   Wt 206 lb (93.4 kg)   SpO2 94% Comment: 3L/02 at rest  BMI 37.68 kg/m²      Physical Exam   Constitutional: Patient appears moderately built and moderately nourished. In no acute distress. Head: Normocephalic and atraumatic. Mouth/Throat: Oropharynx is clear and moist.  No oral thrush. Neck: Neck supple. No tracheal deviation present. Cardiovascular: Regular rate, regular rhythm, S1 and S2 with no murmur. No peripheral edema  Pulmonary/Chest: Normal effort with clear breath sounds. No stridor. No respiratory distress. Abdominal: Soft. No distension or tenderness to palpation. Musculoskeletal: Moves all extremities  Lymphadenopathy:  No cervical adenopathy. Neurological: Patient is alert and oriented to person, place, and time. No focal deficits. Skin: Warm and dry. No clubbing or cyanosis. Test results   Lung Nodule Screening     [] Qualifies    [x] Does not qualify   [] Declined    [] Completed                                                                             Assessment      Diagnosis Orders   1. Stage 4 very severe COPD by GOLD classification ([address]  6 Minute Walk Test    Alpha-1-Antitrypsin    Pulse oximetry, overnight    External Referral To Pulmonology    External Referral To Pulmonary Rehab   2. Pneumonia of left lower lobe due to infectious organism (HCC)  6 Minute Walk Test    XR CHEST STANDARD (2 VW)    With parapneumonic effusion   3. Chronic respiratory failure with hypoxia and hypercapnia (HCC)  6 Minute Walk Test    Pulse oximetry, overnight   4. Paroxysmal atrial fibrillation (HCC)      With history of WPW recent nomral QTc on ECG         Plan       Six Minute Walk Test  Louise Tinoco 1965    Six minute walk test done in my office today by my medical assistant Sadia ByrdCira Willams's oxygen saturation at rest on room air was 85%. The six minute walk test was started with oxygen supplementation. Oxygen supplimentation was started with 2LPM via nasal cannula and titrated to 3 LPM via nasal cannula. At the end of the test Malini Matos oxygen saturation remained at 91% on 3 LPM with exertion. She is mobile in the home and requires oxygen as outlined above. She ambulated on 324 feet, stopping due to SOB and hip pain. Her resting heart rate was 93 and maximum was 108 at completion of study. Nasal Oxygen order:  2-3 lpm to be used with:  Walking Yes   Sleep Yes Continuous Yes    DME Medical Necessity Documentation    Rd Elizabeth was seen in the office on 6/13/2019 for the diagnosis COPD. I am prescribing oxygen because the diagnosis and testing requires the patient to have oxygen in the home.  her condition will improve or be benefited by oxygen use.  The patient is able to perform good mobility in a home setting and therefore does require the use of a portable oxygen system. Continue home 02 at 2 Liters with rest and 3 Liters with activity. Continue Trilogy with sleep with 4 Liters bleed in. Will arrange for repeat overnight pulsox. Stop Breo and Spiriva. Start Trelegy 1 puff daily (rinse and spit). 1 sample given. Continue ANGEL PRN. Continue Daliresp. Start Zithromax 250 mg 3 times per week. Baseline QT is normal. Monitor accordingly. CXR to follow up PNA/effusion. Check A1A. REferral to Pulmonary Rehab. Remain smoke free!! Referral to [facility] to evaluate surgical options. 45 minutes was spent on this visit with over 50% being face to face obtaining history, reviewing recent and prior testing and records with patient, obtaining Trilogy download and review, examining patient, 6 minute walk and formulating a plan of care.       Will see Marcin Trotter back in: 4-6 weeks    Electronically signed by AMINAH Meehan CNP on 6/13/2019 at 1:02 PM'

## 2019-06-13 NOTE — PROGRESS NOTES
[facility]    Patient: Jamari Osuna, 47 y.o.   : 1965    Pt of Dr. Cortney Damon   Patient presents with    COPD     goldy 19    Other     neck   19.75     mp 3        HPI  Sharron Ramíerz is here for 6 month follow up for very severe COPD with FEV1 16%. She has a 34 pack year smoking history quitting 19 when hospitalized at [facility] for respiratory failure and LLL PNA. She was intubated. Presents today continuing to feel SOB and fatigued. She is on home 02 at 3 Liters rest and activity and 4 Liters with Trilogy. It sounds as if her Trilogy was set up during a hospitalization and is unclear who is following it. She obtained through [place] [employer] [place]. She has had a normal sleep study in 2016. She previously followed with Dr. Sam Neely and Bonnie Saenz CNP. She had an overnight pulsox done which showed desaturations with the device on 3 Liters (she states was done on 4 Liters). She continues on Spiriva and Breo, using ANGEL 4-6 times per day. She is on Daliresp. Noted to be following Dr. Arthur Alberto for diarrhea and recent colonoscopy. She states she has IBS and the diarrhea was before Daliresp and no worse. Weight and appetite have been stable. She is on chronic Prednisone 5 mg daily and followed with Endocrinology. She developed Cushingoid vs Lucas's from long term use. She has not followed up with a CXR. Noted to have an effusion with LLL infiltrate on records viewed through [facility] at [facility]. Progress History:   Since last visit any new medical issues? No  New ER or hospitlal visits? Yes As above  Any new or changes in medicines? No  Using inhalers? Yes Spiriva and Breo  Are they helpful?  Somewhat  Past Medical hx   PMH:  Past Medical History:   Diagnosis Date    Abdominal pain, unspecified site     Anxiety     Asthma     Chest pain, unspecified     Chronic kidney disease     Depression     Diarrhea     Essential hypertension, benign     GERD (gastroesophageal reflux disease)     Headache(784.0)     Impaired fasting glucose     Leukocytosis, unspecified     Lump or mass in breast     Mixed hyperlipidemia     Obstructive chronic bronchitis with exacerbation (HCC)     Other and unspecified angina pectoris     Sinus infection     Stage 4 very severe COPD by GOLD classification ([facility] [address]     Type II or unspecified type diabetes mellitus without mention of complication, not stated as uncontrolled     Quiles-Parkinson-White syndrome 4/22/15     SURGICAL HISTORY:  Past Surgical History:   Procedure Laterality Date    APPENDECTOMY      BLADDER SUSPENSION      CARDIAC CATHETERIZATION      CHOLECYSTECTOMY      [facility]  [id number]    [facility]    DILATION AND CURETTAGE OF UTERUS      x3    EGD  2017    [facility]    ENDOMETRIAL ABLATION  2010    HEMORRHOID SURGERY      TUBAL LIGATION      URETHRA SURGERY      sling     SOCIAL HISTORY:  Social History     Tobacco Use    Smoking status: Former Smoker     Years: 34.00     Types: Cigarettes     Start date: 1981     Last attempt to quit: 2019     Years since quittin.1    Smokeless tobacco: Never Used    Tobacco comment: smokes 10 cig weekly   Substance Use Topics    Alcohol use: No     Alcohol/week: 0.0 oz    Drug use: No     ALLERGIES:  Allergies   Allergen Reactions    Pcn [Penicillins]     Sulfa Antibiotics      FAMILY HISTORY:  Family History   Problem Relation Age of Onset    Diabetes Mother     High Blood Pressure Mother     High Blood Pressure Father     Heart Disease Father      CURRENT MEDICATIONS:  Current Outpatient Medications   Medication Sig Dispense Refill    albuterol sulfate HFA (VENTOLIN HFA) 108 (90 Base) MCG/ACT inhaler Inhale 2 puffs into the lungs every 6 hours as needed for Wheezing      predniSONE (DELTASONE) 5 MG tablet Take 5 mg by mouth daily      apixaban (ELIQUIS) 5 MG TABS tablet Take by mouth 2 times daily      azithromycin (ZITHROMAX) 250 MG tablet Take 1 tablet by mouth three times a week Monday - Wednesday-Friday 12 tablet 0    Fluticasone-Umeclidin-Vilant (TRELEGY ELLIPTA) 100-62.5-25 MCG/INH AEPB Inhale 1 puff into the lungs daily 30 each 11    pantoprazole (PROTONIX) 40 MG tablet Take 40 mg by mouth daily      ALPRAZolam (XANAX) 0.25 MG tablet Take 0.25 mg by mouth nightly as needed for Sleep. Rebecac Pyle potassium chloride (KLOR-CON) 20 MEQ packet Take 20 mEq by mouth daily Two tablets once a day      prednisoLONE 5 MG TABS Take 5 mg by mouth daily      polyethylene glycol (GLYCOLAX) powder Colonoscopy Prep Dispense 255 Gram Bottle. Use as Directed 255 g 0    DALIRESP 500 MCG tablet TAKE 1 TABLET EVERY DAY 90 tablet 3    albuterol (PROVENTIL) (2.5 MG/3ML) 0.083% nebulizer solution INHALE CONTENTS OF 1 VIAL VIA NEBULIZER EVERY 6 HOURS AS NEEDED FOR WHEEZING 360 mL 3    DULoxetine (CYMBALTA) 30 MG extended release capsule Take 60 mg by mouth daily       furosemide (LASIX) 40 MG tablet Take 20 mg by mouth daily       spironolactone (ALDACTONE) 25 MG tablet Take 25 mg by mouth daily      traMADol (ULTRAM) 50 MG tablet Take 50 mg by mouth 2 times daily as needed for Pain. Rebecca Pyle metFORMIN (GLUCOPHAGE) 1000 MG tablet Take 500 mg by mouth daily (with breakfast) Indications: only when on steroids       clonazePAM (KLONOPIN) 0.5 MG tablet Take 0.5 mg by mouth 2 times daily as needed      traZODone (DESYREL) 50 MG tablet Take 50 mg by mouth nightly      metoprolol (LOPRESSOR) 25 MG tablet Take 50 mg by mouth 2 times daily       isosorbide mononitrate (IMDUR) 30 MG CR tablet Take 30 mg by mouth daily       busPIRone (BUSPAR) 10 MG tablet Take 15 mg by mouth 3 times daily Indications: Pt. takes 20mg at night, 10 mg in morning       atorvastatin (LIPITOR) 40 MG tablet Take 40 mg by mouth daily. No current facility-administered medications for this visit. Nicko HART   General/Constitutional: No recent loss of weight or Pneumonia of left lower lobe due to infectious organism (HCC)  6 Minute Walk Test    XR CHEST STANDARD (2 VW)   3. Chronic respiratory failure with hypoxia and hypercapnia (HCC)  6 Minute Walk Test    Pulse oximetry, overnight   4.  Paroxysmal atrial fibrillation [facility])           Plan     ***  Will see Cassidy Raille back in: {NUMBERS 1-12:10} {DAY, DAYS, WEEK, WEEKS,MONTH, MONTHS, YEAR, [id number]}    Electronically signed by AMINAH Segovia CNP on 6/13/2019 at 9:59 AM'

## 2019-06-18 DIAGNOSIS — J18.9 PNEUMONIA OF LEFT LOWER LOBE DUE TO INFECTIOUS ORGANISM: ICD-10-CM

## 2019-06-24 RX ORDER — AZITHROMYCIN 250 MG/1
250 TABLET, FILM COATED ORAL
Qty: 36 TABLET | Refills: 3 | Status: SHIPPED | OUTPATIENT
Start: 2019-06-24 | End: 2019-07-01 | Stop reason: SDUPTHER

## 2019-06-24 RX ORDER — ALBUTEROL SULFATE 90 UG/1
2 AEROSOL, METERED RESPIRATORY (INHALATION) EVERY 6 HOURS PRN
Qty: 3 INHALER | Refills: 3 | Status: SHIPPED | OUTPATIENT
Start: 2019-06-24 | End: 2019-06-27 | Stop reason: SDUPTHER

## 2019-06-26 ENCOUNTER — CLINICAL DOCUMENTATION (OUTPATIENT)
Dept: PULMONOLOGY | Age: 54
End: 2019-06-26

## 2019-06-26 DIAGNOSIS — J96.11 CHRONIC RESPIRATORY FAILURE WITH HYPOXIA AND HYPERCAPNIA (HCC): ICD-10-CM

## 2019-06-26 DIAGNOSIS — J96.12 CHRONIC RESPIRATORY FAILURE WITH HYPOXIA AND HYPERCAPNIA (HCC): ICD-10-CM

## 2019-06-26 DIAGNOSIS — J44.9 STAGE 4 VERY SEVERE COPD BY GOLD CLASSIFICATION (HCC): ICD-10-CM

## 2019-06-26 DIAGNOSIS — J44.9 STAGE 3 SEVERE COPD BY GOLD CLASSIFICATION (HCC): Primary | ICD-10-CM

## 2019-06-27 RX ORDER — ALBUTEROL SULFATE 90 UG/1
2 AEROSOL, METERED RESPIRATORY (INHALATION) EVERY 6 HOURS PRN
Qty: 3 INHALER | Refills: 3 | Status: SHIPPED | OUTPATIENT
Start: 2019-06-27 | End: 2020-01-27

## 2019-07-01 RX ORDER — AZITHROMYCIN 250 MG/1
250 TABLET, FILM COATED ORAL
Qty: 36 TABLET | Refills: 3 | Status: SHIPPED | OUTPATIENT
Start: 2019-07-01 | End: 2019-07-31

## 2019-08-21 ENCOUNTER — OFFICE VISIT (OUTPATIENT)
Dept: PULMONOLOGY | Age: 54
End: 2019-08-21
Payer: MEDICARE

## 2019-08-21 VITALS
WEIGHT: 209 LBS | SYSTOLIC BLOOD PRESSURE: 121 MMHG | HEIGHT: 62 IN | OXYGEN SATURATION: 94 % | DIASTOLIC BLOOD PRESSURE: 75 MMHG | BODY MASS INDEX: 38.46 KG/M2 | HEART RATE: 81 BPM | TEMPERATURE: 97.9 F

## 2019-08-21 DIAGNOSIS — J96.12 CHRONIC RESPIRATORY FAILURE WITH HYPOXIA AND HYPERCAPNIA (HCC): ICD-10-CM

## 2019-08-21 DIAGNOSIS — F17.218 CIGARETTE NICOTINE DEPENDENCE WITH OTHER NICOTINE-INDUCED DISORDER: ICD-10-CM

## 2019-08-21 DIAGNOSIS — J44.9 STAGE 4 VERY SEVERE COPD BY GOLD CLASSIFICATION (HCC): Primary | ICD-10-CM

## 2019-08-21 DIAGNOSIS — Z79.899 POLYPHARMACY: ICD-10-CM

## 2019-08-21 DIAGNOSIS — J96.11 CHRONIC RESPIRATORY FAILURE WITH HYPOXIA AND HYPERCAPNIA (HCC): ICD-10-CM

## 2019-08-21 PROCEDURE — 3017F COLORECTAL CA SCREEN DOC REV: CPT | Performed by: NURSE PRACTITIONER

## 2019-08-21 PROCEDURE — G8417 CALC BMI ABV UP PARAM F/U: HCPCS | Performed by: NURSE PRACTITIONER

## 2019-08-21 PROCEDURE — 4004F PT TOBACCO SCREEN RCVD TLK: CPT | Performed by: NURSE PRACTITIONER

## 2019-08-21 PROCEDURE — G8926 SPIRO NO PERF OR DOC: HCPCS | Performed by: NURSE PRACTITIONER

## 2019-08-21 PROCEDURE — 99406 BEHAV CHNG SMOKING 3-10 MIN: CPT | Performed by: NURSE PRACTITIONER

## 2019-08-21 PROCEDURE — 3023F SPIROM DOC REV: CPT | Performed by: NURSE PRACTITIONER

## 2019-08-21 PROCEDURE — G8427 DOCREV CUR MEDS BY ELIG CLIN: HCPCS | Performed by: NURSE PRACTITIONER

## 2019-08-21 PROCEDURE — 99214 OFFICE O/P EST MOD 30 MIN: CPT | Performed by: NURSE PRACTITIONER

## 2019-08-22 RX ORDER — AZITHROMYCIN 250 MG/1
250 TABLET, FILM COATED ORAL
Qty: 30 TABLET | Refills: 2
Start: 2019-08-23 | End: 2019-09-02

## 2019-08-22 NOTE — PROGRESS NOTES
Two tablets once a day      polyethylene glycol (GLYCOLAX) powder Colonoscopy Prep Dispense 255 Gram Bottle. Use as Directed 255 g 0    albuterol (PROVENTIL) (2.5 MG/3ML) 0.083% nebulizer solution INHALE CONTENTS OF 1 VIAL VIA NEBULIZER EVERY 6 HOURS AS NEEDED FOR WHEEZING 360 mL 3    DULoxetine (CYMBALTA) 30 MG extended release capsule Take 60 mg by mouth daily       furosemide (LASIX) 40 MG tablet Take 20 mg by mouth daily       spironolactone (ALDACTONE) 25 MG tablet Take 25 mg by mouth daily      traMADol (ULTRAM) 50 MG tablet Take 50 mg by mouth 2 times daily as needed for Pain. Shannan Fore metFORMIN (GLUCOPHAGE) 1000 MG tablet Take 500 mg by mouth daily (with breakfast) Indications: only when on steroids       clonazePAM (KLONOPIN) 0.5 MG tablet Take 0.5 mg by mouth 2 times daily as needed      traZODone (DESYREL) 50 MG tablet Take 50 mg by mouth nightly      metoprolol (LOPRESSOR) 25 MG tablet Take 50 mg by mouth 2 times daily       isosorbide mononitrate (IMDUR) 30 MG CR tablet Take 30 mg by mouth daily       busPIRone (BUSPAR) 10 MG tablet Take 15 mg by mouth 3 times daily Indications: Pt. takes 20mg at night, 10 mg in morning       atorvastatin (LIPITOR) 40 MG tablet Take 40 mg by mouth daily. No current facility-administered medications for this visit. Tessa Peaks ROS   General/Constitutional: No recent loss of weight or appetite changes. No fever or chills. HENT: Negative. Eyes: Negative. Upper respiratory tract: No nasal stuffiness or post nasal drip. Lower respiratory tract/ lungs: Frequent cough with minimal sputum production. No hemoptysis. Cardiovascular: No palpitations or chest pain. Gastrointestinal: No nausea or vomiting. Neurological: No focal neurologiacal weakness. Extremities: No edema. Musculoskeletal: No complaints. Genitourinary: No complaints. Hematological: Negative. Psychiatric/Behavioral: Negative. Skin: No itching.      Physical exam   /75 (Site: Left Lower Arm, Position: Sitting, Cuff Size: Medium Adult)   Pulse 81   Temp 97.9 °F (36.6 °C) (Tympanic)   Ht 5' 2\" (1.575 m)   Wt 209 lb (94.8 kg)   SpO2 94% Comment: on 3 LPM on [place]  BMI 38.23 kg/m²      Physical Exam   Constitutional: Patient appears moderately built and moderately nourished. In no acute distress. Head: Normocephalic and atraumatic. Mouth/Throat: Oropharynx is clear and moist. No oral thrush. Neck: Neck supple. No tracheal deviation present. Cardiovascular: Regular rate, regular rhythm, S1 and S2 with no murmur. Trace peripheral edema. Pulmonary/Chest: Normal effort with diminished breath sounds, chronic wheeze. No stridor. No respiratory distress. Abdominal: Soft. No distension or tenderness to palpation. Musculoskeletal: Moves all extremities. Lymphadenopathy:  No cervical adenopathy. Neurological: Patient is alert and oriented to person, place, and time. No focal deficits. Skin: Warm and dry. No clubbing or cyanosis. Test results   Lung Nodule Screening     [] Qualifies    [x] Does not qualify   [] Declined    [] Completed                                                           Assessment      Diagnosis Orders   1. Stage 4 very severe COPD by GOLD classification ([facility] [address]     2. Chronic respiratory failure with hypoxia and hypercapnia (HCC)     3. Cigarette nicotine dependence with other nicotine-induced disorder     4. Polypharmacy           Plan   Continue home 02 at 2 Liters with rest and 3 Liters with activity. Continue Trilogy with sleep and PRN with 4 Liters bleed in. Continue Trelegy 1 and ANGEL PRN. Continue Daliresp. Continue Zithromax 3 times weekly. Consider changing to all Neb treatments next visit. 5 minutes of visit was discussing her smoking and the absolute need for her to quit. She has failed Chantix and Nicotine patch. Due to her severe anxiety and multiple medications, I deferred talking with her PCP about it.  Could combine with Nicotine

## 2020-01-20 ENCOUNTER — HOSPITAL ENCOUNTER (OUTPATIENT)
Dept: GENERAL RADIOLOGY | Age: 55
Discharge: HOME | End: 2020-01-20
Payer: MEDICARE

## 2020-01-20 ENCOUNTER — TELEPHONE (OUTPATIENT)
Dept: PULMONOLOGY | Age: 55
End: 2020-01-20

## 2020-01-20 ENCOUNTER — HOSPITAL ENCOUNTER (OUTPATIENT)
Dept: CT IMAGING | Age: 55
Discharge: HOME | End: 2020-01-20
Payer: MEDICARE

## 2020-01-20 PROCEDURE — 3209999900 XR COMPARISON OF OUTSIDE FILMS

## 2020-01-20 PROCEDURE — 3209999900 CT COMPARISON OF OUTSIDE FILMS

## 2020-01-20 NOTE — TELEPHONE ENCOUNTER
Patient called back stating that she cancelled this appt last week with the call center because she has one in February. We will give her one more chance.

## 2020-01-27 RX ORDER — ALBUTEROL SULFATE 90 UG/1
2 AEROSOL, METERED RESPIRATORY (INHALATION) EVERY 6 HOURS PRN
Qty: 1 INHALER | Refills: 0 | Status: SHIPPED | OUTPATIENT
Start: 2020-01-27 | End: 2020-03-02

## 2020-02-26 ENCOUNTER — OFFICE VISIT (OUTPATIENT)
Dept: PULMONOLOGY | Age: 55
End: 2020-02-26
Payer: MEDICARE

## 2020-02-26 VITALS
TEMPERATURE: 97.3 F | OXYGEN SATURATION: 97 % | HEART RATE: 72 BPM | HEIGHT: 62 IN | SYSTOLIC BLOOD PRESSURE: 124 MMHG | WEIGHT: 206.6 LBS | BODY MASS INDEX: 38.02 KG/M2 | DIASTOLIC BLOOD PRESSURE: 70 MMHG

## 2020-02-26 PROCEDURE — G8417 CALC BMI ABV UP PARAM F/U: HCPCS | Performed by: NURSE PRACTITIONER

## 2020-02-26 PROCEDURE — 3023F SPIROM DOC REV: CPT | Performed by: NURSE PRACTITIONER

## 2020-02-26 PROCEDURE — G8484 FLU IMMUNIZE NO ADMIN: HCPCS | Performed by: NURSE PRACTITIONER

## 2020-02-26 PROCEDURE — 94618 PULMONARY STRESS TESTING: CPT | Performed by: NURSE PRACTITIONER

## 2020-02-26 PROCEDURE — G8427 DOCREV CUR MEDS BY ELIG CLIN: HCPCS | Performed by: NURSE PRACTITIONER

## 2020-02-26 PROCEDURE — 99215 OFFICE O/P EST HI 40 MIN: CPT | Performed by: NURSE PRACTITIONER

## 2020-02-26 PROCEDURE — 3017F COLORECTAL CA SCREEN DOC REV: CPT | Performed by: NURSE PRACTITIONER

## 2020-02-26 PROCEDURE — 99407 BEHAV CHNG SMOKING > 10 MIN: CPT | Performed by: NURSE PRACTITIONER

## 2020-02-26 PROCEDURE — 4004F PT TOBACCO SCREEN RCVD TLK: CPT | Performed by: NURSE PRACTITIONER

## 2020-02-26 PROCEDURE — G8926 SPIRO NO PERF OR DOC: HCPCS | Performed by: NURSE PRACTITIONER

## 2020-02-26 RX ORDER — FORMOTEROL FUMARATE 20 UG/2ML
20 SOLUTION RESPIRATORY (INHALATION) EVERY 12 HOURS
Qty: 120 ML | Refills: 11 | Status: SHIPPED | OUTPATIENT
Start: 2020-02-26 | End: 2020-03-27

## 2020-02-26 RX ORDER — POLYETHYLENE GLYCOL 3350 17 G
2 POWDER IN PACKET (EA) ORAL PRN
Qty: 100 EACH | Refills: 3 | Status: SHIPPED | OUTPATIENT
Start: 2020-02-26

## 2020-02-26 RX ORDER — HYDROCODONE BITARTRATE AND ACETAMINOPHEN 5; 325 MG/1; MG/1
1 TABLET ORAL EVERY 6 HOURS PRN
COMMUNITY

## 2020-02-26 RX ORDER — NICOTINE 21-14-7MG
1 KIT TRANSDERMAL DAILY
Qty: 1 KIT | Refills: 0 | Status: SHIPPED | OUTPATIENT
Start: 2020-02-26

## 2020-02-26 RX ORDER — ERYTHROMYCIN 250 MG/1
250 TABLET, COATED ORAL
Qty: 15 TABLET | Refills: 0
Start: 2020-02-26 | End: 2020-03-27

## 2020-02-26 RX ORDER — BUDESONIDE 0.5 MG/2ML
500 INHALANT ORAL 2 TIMES DAILY
Qty: 120 ML | Refills: 11 | Status: SHIPPED | OUTPATIENT
Start: 2020-02-26

## 2020-02-26 NOTE — PATIENT INSTRUCTIONS
smoking, you improve the health of everyone who now breathes in your smoke. · Their heart, lung, and cancer risks drop, much like yours. · They are sick less. For babies and small children, living smoke-free means they're less likely to have ear infections, pneumonia, and bronchitis. · If you're a woman who is or will be pregnant someday, quitting smoking means a healthier . · Children who are close to you are less likely to become adult smokers. Where can you learn more? Go to https://[employer].mSpoke. org and sign in to your [employer] account. Enter [phone] in the [facility] box to learn more about \"Learning About Benefits From Quitting Smoking. \"     If you do not have an account, please click on the \"Sign Up Now\" link. Current as of: 2019  Content Version: 12.3  © 1875-4855 [employer], [employer]. Care instructions adapted under license by [facility] ([facility]). If you have questions about a medical condition or this instruction, always ask your healthcare professional. [address] any warranty or liability for your use of this information.

## 2020-02-26 NOTE — PROGRESS NOTES
[facility]     Patient: Alverto Farrell, 49 y.o.   : 1965   Pt of Dr. James Brooks     Chief Complaint   Patient presents with    Follow-up     COPD 6month follow up with no testing. HPI  Abhijit Coleman is here for 6 month follow up for very severe COPD. Last seen in August, see below for details of history/prior visits. 2 hospitalization since then, discharged from [facility] beginning of January and [facility] mid February. Not feeling back to baseline. Treated for exacerbations. Continues to smoke 5 cigarettes per day. Tried acupuncture as discussed, failed. Continues on Trelegy, Ventolin Neb 4-6 times per day. Daliresp 500 mcg daily. Erythromycin 250 mg 3 times per week (stable QT). Prednisone 10 mg added daily by PCP. Takes Xanax PRN for anxiety. Home 02 at 2 Liters with rest, 3-4 Liters with activity and on Trilogy with 4 Liters bleed in. Essentially home bound. Has chronic leg pain, limiting her walking. Notable increased MOURA since last seen, cough is unchanged. Deferred referral to [facility]. Deferred Pulmonary Rehab. [facility], discussing Palliative vs Hospice Care. FEV1 27%. A1A MM. Normal sleep study in 2016.    19 visit:  Abhijit Coleman is here for 6 week follow up for very severe COPD. See below for details of her history and my last visit. Since last seen, she was treated for a recurrent exacerbation and still continues to recover. She is back to smoking 5-6 cigarettes per day with a 34 pack year history. Started back up at a family reunion and has significant anxiety with her SOB. She is A1A MM. Continues on 02 at 2 Liters rest, 3 Liters with activity. Was changed to Trelegy last visit, no significant difference, continuing to use Ventolin Neb 4 times daily with Trilogy when sleeping. Using more when awake during the day as well. PS was increased to 6.  Was also started on Zithromax 3 times weekly, baseline QTc on ECG was History:   Since last visit any new medical issues? No  New ER or hospitlal visits? Yes as above  Any new or changes in medicines? Yes   Using inhalers? Yes   Are they helpful?  Yes     Past Medical hx   PMH:  Past Medical History:   Diagnosis Date    Abdominal pain, unspecified site     Anxiety     Asthma     Chest pain, unspecified     Chronic kidney disease     Depression     Diarrhea     Essential hypertension, benign     GERD (gastroesophageal reflux disease)     Headache(784.0)     Impaired fasting glucose     Leukocytosis, unspecified     Lump or mass in breast     Mixed hyperlipidemia     Obstructive chronic bronchitis with exacerbation (HCC)     Other and unspecified angina pectoris     Sinus infection     Stage 4 very severe COPD by GOLD classification ([facility] [address]     Type II or unspecified type diabetes mellitus without mention of complication, not stated as uncontrolled     Quiles-Parkinson-White syndrome 4/22/15     SURGICAL HISTORY:  Past Surgical History:   Procedure Laterality Date    APPENDECTOMY      BLADDER SUSPENSION      CARDIAC CATHETERIZATION      CHOLECYSTECTOMY      COLONOSCOPY  [id number]    [facility]    DILATION AND CURETTAGE OF UTERUS      x3    EGD  2017    [facility]    ENDOMETRIAL ABLATION  2010    HEMORRHOID SURGERY      TUBAL LIGATION      URETHRA SURGERY      sling     SOCIAL HISTORY:  Social History     Tobacco Use    Smoking status: Current Every Day Smoker     Packs/day: 1.00     Years: 34.00     Pack years: 34.00     Types: Cigarettes     Start date: 5/6/1981    Smokeless tobacco: Never Used    Tobacco comment: 5 cigarettes a day 2/26/20   Substance Use Topics    Alcohol use: No     Alcohol/week: 0.0 standard drinks    Drug use: No     ALLERGIES:  Allergies   Allergen Reactions    Pcn [Penicillins]     Sulfa Antibiotics      FAMILY HISTORY:  Family History   Problem Relation Age of Onset    Diabetes Mother     High Blood Pressure Mother     High Blood Pressure daily       spironolactone (ALDACTONE) 25 MG tablet Take 25 mg by mouth daily      metFORMIN (GLUCOPHAGE) 1000 MG tablet Take 500 mg by mouth daily (with breakfast) Indications: only when on steroids       clonazePAM (KLONOPIN) 0.5 MG tablet Take 0.5 mg by mouth 2 times daily as needed      traZODone (DESYREL) 50 MG tablet Take 50 mg by mouth nightly      metoprolol (LOPRESSOR) 25 MG tablet Take 50 mg by mouth 2 times daily       isosorbide mononitrate (IMDUR) 30 MG CR tablet Take 30 mg by mouth daily       busPIRone (BUSPAR) 10 MG tablet Take 15 mg by mouth 3 times daily Indications: Pt. takes 20mg at night, 10 mg in morning       atorvastatin (LIPITOR) 40 MG tablet Take 40 mg by mouth daily. No current facility-administered medications for this visit. Jean Claude HART   General/Constitutional: No recent loss of weight or appetite changes. No fever or chills. HENT: Negative. Eyes: Negative. Upper respiratory tract: No nasal stuffiness or post nasal drip. Lower respiratory tract/ lungs: + cough, pale yellow to white sputum production. No hemoptysis. Cardiovascular: No palpitations or chest pain. Gastrointestinal: No nausea or vomiting. Neurological: No focal neurologiacal weakness. Extremities: No edema. Musculoskeletal: No new complaints. Genitourinary: No complaints. Hematological: Negative. Psychiatric/Behavioral: Negative. Skin: No itching. Physical exam   /70 (Site: Left Lower Arm, Position: Sitting, Cuff Size: Medium Adult)   Pulse 72   Temp 97.3 °F (36.3 °C) (Tympanic)   Ht 5' 2\" (1.575 m)   Wt 206 lb 9.6 oz (93.7 kg)   SpO2 97% Comment: on 3 LPM on inogen  BMI 37.79 kg/m²      Constitutional: Patient appears moderately built and moderately nourished in no acute distress. Head: Normocephalic and atraumatic. Mouth/Throat: Oropharynx is clear and moist. No oral thrush. Eyes: Conjunctivae are normal. Pupils are equal, round, and reactive to light.  No scleral icterus. Neck: Neck supple. No JVD or tracheal deviation present. Cardiovascular: Regular rate, regular rhythm, S1 and S2 with no murmur. Trace peripheral edema. Pulmonary/Chest: Normal effort with very diminished breath sounds. No wheezing, rales or rhonchi. Increased AP diameter. No stridor. No respiratory distress. Abdominal: Soft. Bowel sounds audible. No distension or tenderness to palpation. Musculoskeletal: Moves all extremities. Lymphadenopathy: No cervical adenopathy. Neurological: Patient is alert and oriented to person, place, and time. No focal deficits. Skin: Skin is warm and dry. No clubbing, no cyanosis. Test results   Lung Nodule Screening     [] Qualifies    [x]Does not qualify   [] Declined    [] Completed                   4/8/19  Findings:     ~Left Ventricle: Normal left ventricle size. Normal global systolic LV function. The ejection fraction is visually estimated to be 60 %. No regional wall motion abnormality noted. Normal diastolic left ventricular function.    ~Right Ventricle: Moderate enlargement of the right ventricle.    ~Left Atrium: Normal left atrial size. Normal appearing atrial septum.    ~Right Atrium: Moderate enlargement of the right atrium. Normal appearing atrial septum.    ~Mitral Valve: Normal mitral valve appearance and function.    ~Aortic Valve: Aortic valve not well visualized. The aortic valve is normal in function.    ~Tricuspid Valve: Normal tricuspid valve appearance and function.    ~Pulmonic Valve: Pulmonary valve not visualized.    ~Great Vessels: The aorta appears normal. The IVC is dilated.    ~Pericardium: Trivial pericardial effusion.      Conclusions:    Normal global systolic LV function. The ejection fraction is visually estimated to be 60 %. Moderate enlargement of the right ventricle. Moderate enlargement of the right atrium. The IVC is dilated.  No significant valvular abnormalities.      EKG Standard4/11/2019  [employer]  Result prescribing oxygen because the diagnosis and testing requires the patient to have oxygen in the home.  her condition will improve or be benefited by oxygen use. The patient is able to perform good mobility in a home setting and therefore does require the use of a portable oxygen system. Continue Trilogy with 4 Liters. Obtain download. Change Trelegy to Neb treatments. Yupelri daily. Pulmicort/Performist BID. Continue Ventolin Neb/HFA PRN. Continue Daliresp. Continue Prednisone 10 mg daily. Defer increasing macrolide due to risk of prolonged QT and history of WPW. Ativan PRN. Advised of risks with taking sedating medications with severity of her COPD. Use Trilogy when SOB!! She is interested in demonstration with [facility]. She agrees to Pulmonary Rehab. UTD with vaccines. We discussed new option of pulmonary valve procedures if smoke free for 4 months. Discussed Palliative Care as well, if she defers to quit smoking, I recommend Palliative Care and we discussed eventual transition to Hospice. >11 minutes of visit was discussing smoking, health risks involved and cessation treatment options. We discussed the risks and benefits of various tobacco cessation strategies, including \"cold turkey,\" nicotine replacement (nicotine patch, gum, etc.), Wellbutrin and Chantix and combination therapy if needed. She struggles with anxiety/depression and had chest pain with Chantix. Has failed Bupropion, Nicotine patch and gum, Acupuncture and Hypnosis (by phone sole). She has not tried combination therapy. Will try patch with gum for breakthrough cravings.  Has to change habits!!  Daughter is with her today who also smokes, quit together!!    >40 minutes was spent on this visit with > 50% being face to face obtaining HPI, examining patient, reviewing test results, hospitalizations, educating about disease process, discussing smoking cessation, monitoring testing/discussing results (6 minute walk) and coordinating a treatment plan. Will see Truong Smith in: 3 months.     AMINAH Stone, ACNP-C  2/26/2020

## 2020-02-28 ENCOUNTER — TELEPHONE (OUTPATIENT)
Dept: PULMONOLOGY | Age: 55
End: 2020-02-28

## 2020-03-02 ENCOUNTER — TELEPHONE (OUTPATIENT)
Dept: PULMONOLOGY | Age: 55
End: 2020-03-02

## 2020-03-02 RX ORDER — ALBUTEROL SULFATE 90 UG/1
2 AEROSOL, METERED RESPIRATORY (INHALATION) EVERY 6 HOURS PRN
Qty: 1 INHALER | Refills: 11 | Status: SHIPPED | OUTPATIENT
Start: 2020-03-02

## 2020-03-02 NOTE — TELEPHONE ENCOUNTER
Requested Prescriptions     Pending Prescriptions Disp Refills    albuterol sulfate  (90 Base) MCG/ACT inhaler [Pharmacy Med Name: ALBUTEROL SULFATE  (90 Base) MCG/ACT Aerosol Solution] 18 g      Sig: INHALE 2 PUFFS INTO THE LUNGS EVERY 6 HOURS AS NEEDED FOR WHEEZING       LAST OV :  2-26-20    NEXT OV:  5-27-20      Pharmacy [facility] [place]

## 2020-11-28 RX ORDER — AZITHROMYCIN 250 MG/1
TABLET, FILM COATED ORAL
Qty: 36 TABLET | Refills: 0 | Status: SHIPPED | OUTPATIENT
Start: 2020-11-28 | End: 2021-02-11

## 2023-04-13 NOTE — TELEPHONE ENCOUNTER
I spoke with Mavis Garza last evening after returning to [facility] after office @ [facility] re: Dorys Boothe. It was explained to me that Dorys Boothe has Financial Hardships and [facility] does not offer the Xolair inj at this time. Would you be agreeable to referring Dorys Boothe to see an Allergist?  Please advise.
Left message for patient to return my call regarding referral to Allergist.
Yes